# Patient Record
Sex: FEMALE | Race: WHITE | NOT HISPANIC OR LATINO
[De-identification: names, ages, dates, MRNs, and addresses within clinical notes are randomized per-mention and may not be internally consistent; named-entity substitution may affect disease eponyms.]

---
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
## 2020-01-14 ENCOUNTER — APPOINTMENT (OUTPATIENT)
Dept: DERMATOLOGY | Facility: CLINIC | Age: 20
End: 2020-01-14
Payer: COMMERCIAL

## 2020-01-14 VITALS — WEIGHT: 110 LBS | HEIGHT: 60 IN | BODY MASS INDEX: 21.6 KG/M2

## 2020-01-14 DIAGNOSIS — D48.9 NEOPLASM OF UNCERTAIN BEHAVIOR, UNSPECIFIED: ICD-10-CM

## 2020-01-14 DIAGNOSIS — D22.5 MELANOCYTIC NEVI OF TRUNK: ICD-10-CM

## 2020-01-14 PROCEDURE — 99213 OFFICE O/P EST LOW 20 MIN: CPT | Mod: 25

## 2020-01-14 PROCEDURE — 11102 TANGNTL BX SKIN SINGLE LES: CPT

## 2020-01-14 RX ORDER — ERYTHROMYCIN 20 MG/ML
2 SOLUTION TOPICAL TWICE DAILY
Qty: 1 | Refills: 1 | Status: DISCONTINUED | COMMUNITY
Start: 2019-10-11 | End: 2020-01-14

## 2020-01-14 RX ORDER — MEDROXYPROGESTERONE ACETATE 150 MG/ML
150 INJECTION, SUSPENSION INTRAMUSCULAR
Qty: 1 | Refills: 0 | Status: DISCONTINUED | COMMUNITY
Start: 2019-08-21 | End: 2020-01-14

## 2020-01-14 RX ORDER — CEPHALEXIN 500 MG/1
500 CAPSULE ORAL 3 TIMES DAILY
Qty: 21 | Refills: 0 | Status: DISCONTINUED | COMMUNITY
Start: 2019-10-11 | End: 2020-01-14

## 2020-01-14 RX ORDER — LEVONORGESTREL / ETHINYL ESTRADIOL 0.15-0.03
0.15-0.03 KIT ORAL DAILY
Qty: 90 | Refills: 0 | Status: DISCONTINUED | COMMUNITY
Start: 2018-01-08 | End: 2020-01-14

## 2020-01-14 NOTE — ASSESSMENT
[FreeTextEntry1] : The patient was instructed to check portal and/or call the office in one week for biopsy results.

## 2020-01-21 ENCOUNTER — APPOINTMENT (OUTPATIENT)
Dept: DERMATOLOGY | Facility: CLINIC | Age: 20
End: 2020-01-21

## 2020-01-22 LAB — CORE LAB BIOPSY: NORMAL

## 2020-08-03 ENCOUNTER — EMERGENCY (EMERGENCY)
Facility: HOSPITAL | Age: 20
LOS: 0 days | Discharge: HOME | End: 2020-08-03
Payer: COMMERCIAL

## 2020-08-03 VITALS
DIASTOLIC BLOOD PRESSURE: 65 MMHG | TEMPERATURE: 98 F | HEART RATE: 68 BPM | OXYGEN SATURATION: 100 % | RESPIRATION RATE: 15 BRPM | SYSTOLIC BLOOD PRESSURE: 105 MMHG

## 2020-08-03 DIAGNOSIS — Z20.828 CONTACT WITH AND (SUSPECTED) EXPOSURE TO OTHER VIRAL COMMUNICABLE DISEASES: ICD-10-CM

## 2020-08-03 DIAGNOSIS — J02.9 ACUTE PHARYNGITIS, UNSPECIFIED: ICD-10-CM

## 2020-08-03 PROCEDURE — U0003: CPT

## 2020-08-03 PROCEDURE — 99283 EMERGENCY DEPT VISIT LOW MDM: CPT

## 2020-08-03 NOTE — ED STATDOCS - OBJECTIVE STATEMENT
21 yo female presents with sore throat s?p exposed to someone who was exposed to someone tested + for covid.

## 2020-08-03 NOTE — ED STATDOCS - PATIENT PORTAL LINK FT
You can access the FollowMyHealth Patient Portal offered by [facility] by registering at the following website: [email or web address]. By joining [facility]’s FollowMyHealth portal, you will also be able to view your health information using other applications (apps) compatible with our system.

## 2020-08-03 NOTE — ED ADULT TRIAGE NOTE - CHIEF COMPLAINT QUOTE
Pt presents to ED for covid testing, pt denies symptoms, pt seen and evaluated by PA. pt gave verbal consent for resutls to be recevied via text.

## 2020-08-04 LAB — SARS-COV-2 RNA SPEC QL NAA+PROBE: SIGNIFICANT CHANGE UP

## 2020-08-11 PROBLEM — Z78.9 OTHER SPECIFIED HEALTH STATUS: Chronic | Status: ACTIVE | Noted: 2020-08-03

## 2020-08-25 ENCOUNTER — APPOINTMENT (OUTPATIENT)
Dept: OBGYN | Facility: CLINIC | Age: 20
End: 2020-08-25

## 2020-09-10 ENCOUNTER — APPOINTMENT (OUTPATIENT)
Dept: OBGYN | Facility: CLINIC | Age: 20
End: 2020-09-10
Payer: COMMERCIAL

## 2020-09-10 VITALS
WEIGHT: 112 LBS | DIASTOLIC BLOOD PRESSURE: 62 MMHG | RESPIRATION RATE: 14 BRPM | HEART RATE: 68 BPM | BODY MASS INDEX: 21.99 KG/M2 | SYSTOLIC BLOOD PRESSURE: 100 MMHG | TEMPERATURE: 98 F | HEIGHT: 60 IN

## 2020-09-10 DIAGNOSIS — Z30.019 ENCOUNTER FOR INITIAL PRESCRIPTION OF CONTRACEPTIVES, UNSPECIFIED: ICD-10-CM

## 2020-09-10 LAB
HCG UR QL: NEGATIVE
QUALITY CONTROL: YES

## 2020-09-10 PROCEDURE — 81025 URINE PREGNANCY TEST: CPT

## 2020-09-10 PROCEDURE — 99395 PREV VISIT EST AGE 18-39: CPT

## 2020-09-24 ENCOUNTER — EMERGENCY (EMERGENCY)
Facility: HOSPITAL | Age: 20
LOS: 0 days | Discharge: HOME | End: 2020-09-24
Payer: COMMERCIAL

## 2020-09-24 VITALS
SYSTOLIC BLOOD PRESSURE: 106 MMHG | DIASTOLIC BLOOD PRESSURE: 75 MMHG | OXYGEN SATURATION: 100 % | RESPIRATION RATE: 16 BRPM | HEART RATE: 50 BPM | TEMPERATURE: 98 F

## 2020-09-24 DIAGNOSIS — R50.9 FEVER, UNSPECIFIED: ICD-10-CM

## 2020-09-24 DIAGNOSIS — R09.89 OTHER SPECIFIED SYMPTOMS AND SIGNS INVOLVING THE CIRCULATORY AND RESPIRATORY SYSTEMS: ICD-10-CM

## 2020-09-24 DIAGNOSIS — J06.9 ACUTE UPPER RESPIRATORY INFECTION, UNSPECIFIED: ICD-10-CM

## 2020-09-24 DIAGNOSIS — R05 COUGH: ICD-10-CM

## 2020-09-24 LAB — SARS-COV-2 RNA SPEC QL NAA+PROBE: SIGNIFICANT CHANGE UP

## 2020-09-24 PROCEDURE — 99283 EMERGENCY DEPT VISIT LOW MDM: CPT

## 2020-09-24 PROCEDURE — U0003: CPT

## 2020-09-24 NOTE — ED STATDOCS - CLINICAL SUMMARY MEDICAL DECISION MAKING FREE TEXT BOX
Patient presents with URI symptoms, fever and concern for COVID exposure.  As patient is nontoxic appearing will test for COVID and d/c.  Quarantine reviewed and return precautions reviewed. ~Lui Berrios PA-C

## 2020-09-24 NOTE — ED STATDOCS - PHYSICAL EXAMINATION
PA note: Constitutional: NAD AAOx3  Eyes: EOMI, pupils equal  Head: Normocephalic, atraumatic  ENT: Throat is clear without erythema. No exudates. Airway is patent.  Mouth: no airway obstruction.   Cardiac: S1 S2. regular rate   Resp: Non-labored breathing, no tachypnea. Lungs CTA b/l. No w/r/r. Equal chest expansion and rise. O2sat 100% RA  GI: Abd soft. NT/ND.   Neuro: CN2-12 intact. No focal deficits.   Skin: No rashes  ~Lui Berrios PA-C

## 2020-09-24 NOTE — ED STATDOCS - PROGRESS NOTE DETAILS
How to get your Coronavirus (COVID-19) Testing Results:   Please be advised that you were tested for the coronavirus (COVID-19) in the Emergency Department at [facility].  You are to maintain self-quarantine procedures for 14 days until instructed otherwise by one of our healthcare agents. Please note that the test may take up to 2-4 days to result.  If you do not hear from us within 72 hours and you'd like to check on your results, you can call on of our coronavirus specialists at [address] [place] [address] (available 24/7).  Please DO NOT call the site where you received the test to obtain your results. ~Lui Berrios PA-C

## 2020-09-24 NOTE — ED STATDOCS - NS ED ROS FT
ROS: Constitutional- +Subjective fever, +chills.  Respiratory- +cough, no SOB  Cardiac- no chest pain, no palpitations, ENT- +rhinorrhea, +sore throat, +congestion. No loss of sense of smell or taste. Abdomen- No nausea, no vomiting, no diarrhea.  Urinary- no dysuria, no urgency, no frequency.  Skin- No rashes ~ Lui Berrios PA-C

## 2020-09-24 NOTE — ED STATDOCS - OBJECTIVE STATEMENT
Patient presents to [facility] with c/o subjective fever, cough, runny nose, body aches, sore throat x 3 days. Patient was recently exposed to COVID-19. Patient is here for COVID test. ~Lui Berrios PA-C

## 2020-09-24 NOTE — ED STATDOCS - PATIENT PORTAL LINK FT
You can access the FollowMyHealth Patient Portal offered by [facility] by registering at the following website: [email or web address]. By joining [facility]’s FollowMyHealth portal, you will also be able to view your health information using other applications (apps) compatible with our system.

## 2020-12-16 PROBLEM — J06.9 UPPER RESPIRATORY INFECTION, ACUTE: Status: RESOLVED | Noted: 2018-05-14 | Resolved: 2020-12-16

## 2021-04-01 ENCOUNTER — APPOINTMENT (OUTPATIENT)
Dept: OBGYN | Facility: CLINIC | Age: 21
End: 2021-04-01
Payer: COMMERCIAL

## 2021-04-01 ENCOUNTER — NON-APPOINTMENT (OUTPATIENT)
Age: 21
End: 2021-04-01

## 2021-04-01 VITALS
DIASTOLIC BLOOD PRESSURE: 68 MMHG | SYSTOLIC BLOOD PRESSURE: 98 MMHG | BODY MASS INDEX: 21.4 KG/M2 | HEIGHT: 60 IN | WEIGHT: 109 LBS

## 2021-04-01 PROCEDURE — 99213 OFFICE O/P EST LOW 20 MIN: CPT

## 2021-04-01 PROCEDURE — 99072 ADDL SUPL MATRL&STAF TM PHE: CPT

## 2021-04-01 NOTE — PHYSICAL EXAM
[Acute Distress] : no acute distress [LAD] : no lymphadenopathy [Thyroid Nodule] : no thyroid nodule [Goiter] : no goiter [Mass] : no breast mass [Nipple Discharge] : no nipple discharge [Axillary LAD] : no axillary lymphadenopathy [Tender] : non tender [Distended] : not distended [H/Smegaly] : no hepatosplenomegaly [Depressed Mood] : not depressed [Flat Affect] : affect not flat [Appropriately responsive] : appropriately responsive [Alert] : alert [No Acute Distress] : no acute distress [Oriented x3] : oriented x3

## 2021-04-01 NOTE — PHYSICAL EXAM
[Awake] : awake [Soft] : soft [No Bleeding] : there was no active vaginal bleeding [Normal] : uterus [Uterine Adnexae] : were not tender and not enlarged [Appropriately responsive] : appropriately responsive [Alert] : alert [No Acute Distress] : no acute distress [Oriented x3] : oriented x3

## 2021-04-02 NOTE — HISTORY OF PRESENT ILLNESS
[FreeTextEntry1] : 21 yo G0 with LMP  2/27/21 for birth control follow up.\par \par Steady with BF. Regular menses. Desires to continue same.

## 2021-04-02 NOTE — DISCUSSION/SUMMARY
[FreeTextEntry1] : Risks, benefits and alternatives to OCP discussed with patient including breast cancer, venous thromboembolism and benefits of reduced ovarian cancer. Patient will continue at this time.\par Renew Alesse\par \par STD:\par screen Gc/chl from urine\par \par RTO 6 months for annual

## 2021-08-03 ENCOUNTER — APPOINTMENT (OUTPATIENT)
Dept: DERMATOLOGY | Facility: CLINIC | Age: 21
End: 2021-08-03

## 2021-09-16 ENCOUNTER — APPOINTMENT (OUTPATIENT)
Dept: OBGYN | Facility: CLINIC | Age: 21
End: 2021-09-16

## 2022-06-27 ENCOUNTER — APPOINTMENT (OUTPATIENT)
Dept: OBGYN | Facility: CLINIC | Age: 22
End: 2022-06-27
Payer: COMMERCIAL

## 2022-06-27 ENCOUNTER — TRANSCRIPTION ENCOUNTER (OUTPATIENT)
Age: 22
End: 2022-06-27

## 2022-06-27 VITALS
WEIGHT: 106 LBS | SYSTOLIC BLOOD PRESSURE: 98 MMHG | HEIGHT: 60 IN | DIASTOLIC BLOOD PRESSURE: 60 MMHG | BODY MASS INDEX: 20.81 KG/M2

## 2022-06-27 DIAGNOSIS — Z30.09 ENCOUNTER FOR OTHER GENERAL COUNSELING AND ADVICE ON CONTRACEPTION: ICD-10-CM

## 2022-06-27 DIAGNOSIS — Z30.41 ENCOUNTER FOR SURVEILLANCE OF CONTRACEPTIVE PILLS: ICD-10-CM

## 2022-06-27 DIAGNOSIS — Z11.3 ENCOUNTER FOR SCREENING FOR INFECTIONS WITH A PREDOMINANTLY SEXUAL MODE OF TRANSMISSION: ICD-10-CM

## 2022-06-27 DIAGNOSIS — Z32.02 ENCOUNTER FOR PREGNANCY TEST, RESULT NEGATIVE: ICD-10-CM

## 2022-06-27 LAB
HCG UR QL: NEGATIVE
QUALITY CONTROL: YES

## 2022-06-27 PROCEDURE — 81025 URINE PREGNANCY TEST: CPT

## 2022-06-27 PROCEDURE — 99395 PREV VISIT EST AGE 18-39: CPT

## 2022-06-27 NOTE — HISTORY OF PRESENT ILLNESS
[FreeTextEntry8] : PEYMAN states he has been having a runny nose with clean mucus, coughing up clear phlegm, pain in temples, has had trouble sleeping and pain in eyes since 1/9/2020\par Vijay Tapia
[FreeTextEntry1] : PT PRESENTS FOR ANNUAL EXAM

## 2022-06-27 NOTE — DISCUSSION/SUMMARY
[FreeTextEntry1] : 23 YO PT PRESENTS FOR [employer]. NO COMPLAINTS OR CONCERNS AT THIS TIME. INTERESTED IN POTENTIALLY CHANGING HER BIRTH CONTROL \par UCG- NEGATIVE \par \par CBE WNL, SBE TEACHING CONDUCTED WITH RETURN DEMONSTRATION\par \par PAP SMEAR COLLECTED WITHOUT COMPLICATION- STD TESTING DESIRED \par BIMANUAL EXAM COMPLETED, WNL \par \par CONTRACEPTION AND ACHES DISCUSSED IN GREAT DETAIL\par PATIENT DECIDES ON OCP AT THIS TIME \par RX SENT TO PHARMACY WITH INSTRUCTIONS ON USE \par PT EDUCATED ON IUD PROCEDURE R/B/A D/W PT IN GREAT DETAIL \par ALL QUESTIONS ANSWERED TO PATIENT SATISFACTION \par \par RTO IN 6 MOS FOR OCP REFILL OR PRN \par \par

## 2022-06-27 NOTE — COUNSELING
[Nutrition/ Exercise/ Weight Management] : nutrition, exercise, weight management [Body Image] : body image [Vitamins/Supplements] : vitamins/supplements [Breast Self Exam] : breast self exam [Contraception/ Emergency Contraception/ Safe Sexual Practices] : contraception, emergency contraception, safe sexual practices [STD (testing, results, tx)] : STD (testing, results, tx) [Medication Management] : medication management

## 2022-06-29 LAB — CYTOLOGY CVX/VAG DOC THIN PREP: NORMAL

## 2023-07-14 ENCOUNTER — OFFICE (OUTPATIENT)
Dept: URBAN - METROPOLITAN AREA CLINIC 12 | Facility: CLINIC | Age: 23
Setting detail: OPHTHALMOLOGY
End: 2023-07-14

## 2023-07-14 DIAGNOSIS — H52.13: ICD-10-CM

## 2023-07-14 PROCEDURE — SCREF LASIK EVAL: Performed by: OPHTHALMOLOGY

## 2023-07-14 ASSESSMENT — KERATOMETRY
OS_K1POWER_DIOPTERS: 43.50
OD_K2POWER_DIOPTERS: 44.25
OD_K1POWER_DIOPTERS: 44.25
OS_K2POWER_DIOPTERS: 44.00
OD_AXISANGLE_DEGREES: 090
OS_AXISANGLE_DEGREES: 083

## 2023-07-14 ASSESSMENT — VISUAL ACUITY
OS_BCVA: 20/30-2
OD_BCVA: 20/30

## 2023-07-14 ASSESSMENT — AXIALLENGTH_DERIVED
OS_AL: 25.4924
OS_AL: 25.4924

## 2023-07-14 ASSESSMENT — CONFRONTATIONAL VISUAL FIELD TEST (CVF)
OD_FINDINGS: FULL
OS_FINDINGS: FULL

## 2023-07-14 ASSESSMENT — REFRACTION_AUTOREFRACTION
OD_SPHERE: -6.00
OD_CYLINDER: SPH
OS_CYLINDER: -0.50
OD_AXIS: 0
OS_AXIS: 171
OS_SPHERE: -4.50

## 2023-07-14 ASSESSMENT — REFRACTION_MANIFEST
OS_CYLINDER: -0.50
OD_AXIS: 0
OS_AXIS: 171
OD_VA1: 20/20
OD_CYLINDER: SPH
OS_SPHERE: -4.50
OS_VA1: 20/20
OD_SPHERE: -6.00

## 2023-07-14 ASSESSMENT — REFRACTION_CURRENTRX
OS_VPRISM_DIRECTION: SV
OS_OVR_VA: 20/
OD_VPRISM_DIRECTION: SV
OD_OVR_VA: 20/

## 2023-07-14 ASSESSMENT — SPHEQUIV_DERIVED
OS_SPHEQUIV: -4.75
OS_SPHEQUIV: -4.75

## 2023-07-14 ASSESSMENT — TONOMETRY
OD_IOP_MMHG: 17
OS_IOP_MMHG: 17

## 2023-08-24 ENCOUNTER — OFFICE (OUTPATIENT)
Dept: URBAN - METROPOLITAN AREA CLINIC 12 | Facility: CLINIC | Age: 23
Setting detail: OPHTHALMOLOGY
End: 2023-08-24

## 2023-08-24 DIAGNOSIS — H52.13: ICD-10-CM

## 2023-08-24 PROCEDURE — SCREF LASIK EVAL: Performed by: OPHTHALMOLOGY

## 2023-08-24 ASSESSMENT — CONFRONTATIONAL VISUAL FIELD TEST (CVF)
OD_FINDINGS: FULL
OS_FINDINGS: FULL

## 2023-08-25 ENCOUNTER — RX ONLY (RX ONLY)
Age: 23
End: 2023-08-25

## 2023-08-25 ENCOUNTER — OTHER LOCATION (OUTPATIENT)
Dept: URBAN - METROPOLITAN AREA LASIK CENTER 6 | Facility: LASIK CENTER | Age: 23
Setting detail: OPHTHALMOLOGY
End: 2023-08-25

## 2023-08-25 DIAGNOSIS — H52.13: ICD-10-CM

## 2023-08-25 PROCEDURE — 65760 KERATOMILEUSIS: CPT | Performed by: OPHTHALMOLOGY

## 2023-08-25 ASSESSMENT — REFRACTION_CURRENTRX
OD_CYLINDER: -0.25
OS_CYLINDER: -0.50
OS_SPHERE: -5.00
OS_OVR_VA: 20/
OS_VPRISM_DIRECTION: SV
OD_VPRISM_DIRECTION: SV
OS_OVR_VA: 20/
OD_VPRISM_DIRECTION: SV
OS_AXIS: 171
OD_AXIS: 034
OD_OVR_VA: 20/
OD_CYLINDER: -0.25
OD_SPHERE: -6.25
OD_OVR_VA: 20/
OS_SPHERE: -5.00
OS_CYLINDER: -0.50
OS_AXIS: 171
OD_AXIS: 034
OD_SPHERE: -6.25
OS_VPRISM_DIRECTION: SV

## 2023-08-25 ASSESSMENT — REFRACTION_MANIFEST
OS_VA1: 20/20-2
OD_CYLINDER: -0.25
OS_AXIS: 175
OD_AXIS: 15
OS_AXIS: 175
OD_CYLINDER: -0.25
OD_VA1: 20/20-2
OS_VA1: 20/20-2
OS_SPHERE: -4.05
OS_SPHERE: -4.05
OD_VA1: 20/20-2
OD_SPHERE: -5.87
OS_CYLINDER: -0.75
OD_AXIS: 15
OD_SPHERE: -5.87
OS_CYLINDER: -0.75

## 2023-08-25 ASSESSMENT — AXIALLENGTH_DERIVED
OD_AL: 25.7966
OD_AL: 25.7966
OS_AL: 25.1874
OS_AL: 25.1874
OD_AL: 25.79
OS_AL: 25.3888
OS_AL: 25.3888
OD_AL: 25.79

## 2023-08-25 ASSESSMENT — SPHEQUIV_DERIVED
OD_SPHEQUIV: -5.995
OS_SPHEQUIV: -4.875
OD_SPHEQUIV: -6
OS_SPHEQUIV: -4.42
OD_SPHEQUIV: -5.995
OS_SPHEQUIV: -4.875
OD_SPHEQUIV: -6
OS_SPHEQUIV: -4.42

## 2023-08-25 ASSESSMENT — KERATOMETRY
OD_K1POWER_DIOPTERS: 44.25
OD_K2POWER_DIOPTERS: 44.50
OS_AXISANGLE_DEGREES: 086
OD_AXISANGLE_DEGREES: 092
OD_AXISANGLE_DEGREES: 092
OS_K1POWER_DIOPTERS: 43.75
OS_AXISANGLE_DEGREES: 086
OD_K1POWER_DIOPTERS: 44.25
OD_K2POWER_DIOPTERS: 44.50
OS_K2POWER_DIOPTERS: 44.50
OS_K2POWER_DIOPTERS: 44.50
OS_K1POWER_DIOPTERS: 43.75

## 2023-08-25 ASSESSMENT — REFRACTION_AUTOREFRACTION
OD_SPHERE: -5.75
OD_CYLINDER: -0.50
OS_AXIS: 176
OS_SPHERE: -4.50
OS_AXIS: 176
OS_CYLINDER: -0.75
OD_AXIS: 005
OS_SPHERE: -4.50
OD_CYLINDER: -0.50
OS_CYLINDER: -0.75
OD_SPHERE: -5.75
OD_AXIS: 005

## 2023-08-25 ASSESSMENT — VISUAL ACUITY
OS_BCVA: 20/20
OD_BCVA: 20/20
OD_BCVA: 20/20
OS_BCVA: 20/20

## 2023-08-26 ENCOUNTER — OFFICE (OUTPATIENT)
Dept: URBAN - METROPOLITAN AREA CLINIC 12 | Facility: CLINIC | Age: 23
Setting detail: OPHTHALMOLOGY
End: 2023-08-26

## 2023-08-26 DIAGNOSIS — H52.13: ICD-10-CM

## 2023-08-26 PROCEDURE — 99024 POSTOP FOLLOW-UP VISIT: CPT | Performed by: OPHTHALMOLOGY

## 2023-08-26 ASSESSMENT — CONFRONTATIONAL VISUAL FIELD TEST (CVF)
OD_FINDINGS: FULL
OS_FINDINGS: FULL

## 2023-08-26 ASSESSMENT — REFRACTION_CURRENTRX
OD_VPRISM_DIRECTION: SV
OD_CYLINDER: -0.25
OS_CYLINDER: -0.50
OD_AXIS: 034
OS_SPHERE: -5.00
OS_AXIS: 171
OS_OVR_VA: 20/
OD_OVR_VA: 20/
OS_VPRISM_DIRECTION: SV
OD_SPHERE: -6.25

## 2023-08-26 ASSESSMENT — REFRACTION_AUTOREFRACTION
OS_SPHERE: +1.00
OD_SPHERE: +1.00
OD_CYLINDER: -0.25
OS_CYLINDER: SPHERE
OD_AXIS: 167

## 2023-08-26 ASSESSMENT — REFRACTION_MANIFEST
OS_AXIS: 175
OS_SPHERE: -4.05
OD_VA1: 20/20-2
OD_SPHERE: -5.87
OS_VA1: 20/20-2
OD_CYLINDER: -0.25
OS_CYLINDER: -0.75
OD_AXIS: 15

## 2023-08-26 ASSESSMENT — VISUAL ACUITY
OD_BCVA: 20/15
OS_BCVA: 20/15

## 2023-08-26 ASSESSMENT — KERATOMETRY
OD_AXISANGLE_DEGREES: 90
OD_K1POWER_DIOPTERS: 38.75
OS_K1POWER_DIOPTERS: 39.25
OS_AXISANGLE_DEGREES: 90
OS_K2POWER_DIOPTERS: 39.25
OD_K2POWER_DIOPTERS: 38.75

## 2023-08-26 ASSESSMENT — AXIALLENGTH_DERIVED
OD_AL: 28.53
OD_AL: 25.0845
OS_AL: 27.4084

## 2023-08-26 ASSESSMENT — SPHEQUIV_DERIVED
OD_SPHEQUIV: 0.875
OD_SPHEQUIV: -5.995
OS_SPHEQUIV: -4.42

## 2023-09-01 ENCOUNTER — OFFICE (OUTPATIENT)
Dept: URBAN - METROPOLITAN AREA CLINIC 12 | Facility: CLINIC | Age: 23
Setting detail: OPHTHALMOLOGY
End: 2023-09-01

## 2023-09-01 DIAGNOSIS — H52.13: ICD-10-CM

## 2023-09-01 PROBLEM — H43.393 VITREOUS FLOATERS; BOTH EYES: Status: ACTIVE | Noted: 2023-08-24

## 2023-09-01 PROCEDURE — 99024 POSTOP FOLLOW-UP VISIT: CPT | Performed by: OPTOMETRIST

## 2023-09-01 ASSESSMENT — REFRACTION_CURRENTRX
OS_SPHERE: -5.00
OD_OVR_VA: 20/
OD_SPHERE: -6.25
OD_AXIS: 034
OS_OVR_VA: 20/
OD_VPRISM_DIRECTION: SV
OS_CYLINDER: -0.50
OS_VPRISM_DIRECTION: SV
OD_CYLINDER: -0.25
OS_AXIS: 171

## 2023-09-01 ASSESSMENT — KERATOMETRY
OD_K1POWER_DIOPTERS: 38.50
OD_AXISANGLE_DEGREES: 090
OS_K2POWER_DIOPTERS: 39.50
OD_K2POWER_DIOPTERS: 38.50
OS_K1POWER_DIOPTERS: 39.00
OS_AXISANGLE_DEGREES: 123
METHOD_AUTO_MANUAL: AUTO

## 2023-09-01 ASSESSMENT — VISUAL ACUITY
OS_BCVA: 20/15
OD_BCVA: 20/15-1

## 2023-09-01 ASSESSMENT — REFRACTION_AUTOREFRACTION
OD_CYLINDER: -0.25
OD_AXIS: 007
OS_CYLINDER: -0.25
OS_AXIS: 042
OD_SPHERE: +1.25
OS_SPHERE: +0.75

## 2023-09-01 ASSESSMENT — REFRACTION_MANIFEST
OS_VA1: 20/20-2
OD_SPHERE: -5.87
OD_AXIS: 15
OS_CYLINDER: -0.75
OD_CYLINDER: -0.25
OD_VA1: 20/20-2
OS_SPHERE: -4.05
OS_AXIS: 175

## 2023-09-01 ASSESSMENT — AXIALLENGTH_DERIVED
OD_AL: 25.0782
OD_AL: 28.66
OS_AL: 24.9874
OS_AL: 27.4084

## 2023-09-01 ASSESSMENT — SPHEQUIV_DERIVED
OD_SPHEQUIV: -5.995
OS_SPHEQUIV: 0.625
OD_SPHEQUIV: 1.125
OS_SPHEQUIV: -4.42

## 2023-09-01 ASSESSMENT — TONOMETRY
OS_IOP_MMHG: 12
OD_IOP_MMHG: 13

## 2023-09-01 ASSESSMENT — CONFRONTATIONAL VISUAL FIELD TEST (CVF)
OS_FINDINGS: FULL
OD_FINDINGS: FULL

## 2023-09-26 ENCOUNTER — OFFICE (OUTPATIENT)
Dept: URBAN - METROPOLITAN AREA CLINIC 12 | Facility: CLINIC | Age: 23
Setting detail: OPHTHALMOLOGY
End: 2023-09-26

## 2023-09-26 ENCOUNTER — RX ONLY (RX ONLY)
Age: 23
End: 2023-09-26

## 2023-09-26 DIAGNOSIS — H52.13: ICD-10-CM

## 2023-09-26 PROCEDURE — 99024 POSTOP FOLLOW-UP VISIT: CPT | Performed by: OPHTHALMOLOGY

## 2023-09-26 ASSESSMENT — CONFRONTATIONAL VISUAL FIELD TEST (CVF)
OD_FINDINGS: FULL
OS_FINDINGS: FULL

## 2023-09-26 ASSESSMENT — REFRACTION_CURRENTRX
OS_OVR_VA: 20/
OD_VPRISM_DIRECTION: SV
OD_CYLINDER: -0.25
OS_VPRISM_DIRECTION: SV
OD_OVR_VA: 20/
OD_SPHERE: -6.25
OD_AXIS: 034
OS_CYLINDER: -0.50
OS_AXIS: 171
OS_SPHERE: -5.00

## 2023-09-26 ASSESSMENT — REFRACTION_AUTOREFRACTION
OD_SPHERE: +0.50
OS_SPHERE: +0.25
OD_AXIS: 136
OS_AXIS: 019
OS_CYLINDER: -0.25
OD_CYLINDER: -0.25

## 2023-09-26 ASSESSMENT — AXIALLENGTH_DERIVED
OD_AL: 25.2015
OD_AL: 28.39
OS_AL: 25.0516
OS_AL: 27.22

## 2023-09-26 ASSESSMENT — SPHEQUIV_DERIVED
OD_SPHEQUIV: -5.995
OS_SPHEQUIV: 0.125
OD_SPHEQUIV: 0.375
OS_SPHEQUIV: -4.42

## 2023-09-26 ASSESSMENT — VISUAL ACUITY
OS_BCVA: 20/20
OD_BCVA: 20/20

## 2023-09-26 ASSESSMENT — REFRACTION_MANIFEST
OS_SPHERE: -4.05
OD_SPHERE: -5.87
OD_CYLINDER: -0.25
OS_VA1: 20/20-2
OS_AXIS: 175
OS_CYLINDER: -0.75
OD_AXIS: 15
OD_VA1: 20/20-2

## 2023-09-26 ASSESSMENT — KERATOMETRY
OD_K2POWER_DIOPTERS: 39.00
OD_AXISANGLE_DEGREES: 090
OS_K2POWER_DIOPTERS: 39.75
OS_K1POWER_DIOPTERS: 39.50
METHOD_AUTO_MANUAL: AUTO
OD_K1POWER_DIOPTERS: 39.00
OS_AXISANGLE_DEGREES: 116

## 2023-09-26 ASSESSMENT — TONOMETRY
OS_IOP_MMHG: 12
OD_IOP_MMHG: 11

## 2024-01-29 ENCOUNTER — APPOINTMENT (OUTPATIENT)
Dept: OBGYN | Facility: CLINIC | Age: 24
End: 2024-01-29
Payer: COMMERCIAL

## 2024-01-29 VITALS
BODY MASS INDEX: 23.16 KG/M2 | HEIGHT: 60 IN | DIASTOLIC BLOOD PRESSURE: 66 MMHG | SYSTOLIC BLOOD PRESSURE: 110 MMHG | WEIGHT: 118 LBS

## 2024-01-29 DIAGNOSIS — Z01.419 ENCOUNTER FOR GYNECOLOGICAL EXAMINATION (GENERAL) (ROUTINE) W/OUT ABNORMAL FINDINGS: ICD-10-CM

## 2024-01-29 DIAGNOSIS — Z30.09 ENCOUNTER FOR OTHER GENERAL COUNSELING AND ADVICE ON CONTRACEPTION: ICD-10-CM

## 2024-01-29 PROCEDURE — 99395 PREV VISIT EST AGE 18-39: CPT

## 2024-01-29 NOTE — DISCUSSION/SUMMARY
[FreeTextEntry1] : disc kyleena IUD RBAD  encouraged pt to exercise 2.5 hours minimally a week as per the [facility] , to monitor her cycles, to take calcium foods equalling 1000 mg daily and vitamin D 1000 IU daily and fish oil or flax seed oil for omega 3s. REturn in one yr for a visit. questions answered. Encouraged SBE

## 2024-01-29 NOTE — HISTORY OF PRESENT ILLNESS
[FreeTextEntry1] : 22 yo  has been on aviane, and the depo felt pichardo gained  weight. She is thinking of the iud.  [Currently Active] : currently active [Men] : men [Vaginal] : vaginal [No] : No [Condoms] : Condoms

## 2024-02-01 LAB — CYTOLOGY CVX/VAG DOC THIN PREP: NORMAL

## 2024-03-05 ENCOUNTER — APPOINTMENT (OUTPATIENT)
Dept: OBGYN | Facility: CLINIC | Age: 24
End: 2024-03-05
Payer: COMMERCIAL

## 2024-03-05 VITALS
WEIGHT: 116 LBS | DIASTOLIC BLOOD PRESSURE: 60 MMHG | BODY MASS INDEX: 22.78 KG/M2 | HEART RATE: 75 BPM | RESPIRATION RATE: 14 BRPM | HEIGHT: 60 IN | SYSTOLIC BLOOD PRESSURE: 110 MMHG

## 2024-03-05 PROCEDURE — 99213 OFFICE O/P EST LOW 20 MIN: CPT

## 2024-03-05 NOTE — HISTORY OF PRESENT ILLNESS
[FreeTextEntry1] : Pt presents c/o lump in vaginal wall left side x 1 week. Not painful, tried warm compresses, didnt help, no dc.

## 2024-03-11 ENCOUNTER — APPOINTMENT (OUTPATIENT)
Dept: GYNECOLOGIC ONCOLOGY | Facility: CLINIC | Age: 24
End: 2024-03-11
Payer: COMMERCIAL

## 2024-03-11 DIAGNOSIS — Z78.9 OTHER SPECIFIED HEALTH STATUS: ICD-10-CM

## 2024-03-11 DIAGNOSIS — B37.9 CANDIDIASIS, UNSPECIFIED: ICD-10-CM

## 2024-03-11 DIAGNOSIS — N89.8 OTHER SPECIFIED NONINFLAMMATORY DISORDERS OF VAGINA: ICD-10-CM

## 2024-03-11 DIAGNOSIS — B37.2 CANDIDIASIS OF SKIN AND NAIL: ICD-10-CM

## 2024-03-11 PROCEDURE — 99459 PELVIC EXAMINATION: CPT

## 2024-03-11 PROCEDURE — 99204 OFFICE O/P NEW MOD 45 MIN: CPT

## 2024-03-11 RX ORDER — LEVONORGESTREL AND ETHINYL ESTRADIOL 0.1-0.02MG
0.1-2 KIT ORAL DAILY
Qty: 84 | Refills: 1 | Status: DISCONTINUED | COMMUNITY
Start: 2020-09-10 | End: 2024-03-11

## 2024-03-11 RX ORDER — FLUCONAZOLE 150 MG/1
150 TABLET ORAL
Qty: 1 | Refills: 0 | Status: ACTIVE | COMMUNITY
Start: 2024-03-11 | End: 1900-01-01

## 2024-03-12 NOTE — HISTORY OF PRESENT ILLNESS
[FreeTextEntry1] : 22yo nulligravida LMP 2/14 presents today with referral from Dr. Cochran for vaginal vs. urethral mass. Pt. reports vaginal canal lump palpated about 2 weeks ago prompting visit to GYN.  She was noted to have a katharina-urethral mass by Dr. Cochran with recommendation for referral to Urology. She has made appointment with Dr. Lehman but could not get in until April. She reports trialing warm compresses/baths 3x a week without resolution. She admits to one week of bilateral pelvic cramping, unrelated to menses. She denies abnormal bleeding, discharge or issues with bladder habits.   LPAP- June 2024, negative LMammo- 2019, normal. Performed for palpation of mass, noted to be dense breasts per pt.

## 2024-03-12 NOTE — PLAN
[TextEntry] : VAIBHAV, I&D of skenes gland cyst, cystoscopy Consent signed in office PST with at least CBC, BMP and Coags Patient to cancel above procedure if able to resolve cyst with conservative management

## 2024-03-12 NOTE — CHIEF COMPLAINT
[FreeTextEntry1] : [facility] [phone] at [address] [place] [address]

## 2024-03-12 NOTE — ASSESSMENT
[FreeTextEntry1] : 24yo with vaginal wall cyst c/w a skenes gland cyst on PE today.   I discussed with patient it would be unusual to have a urethral mass and therefore I favor skenes gland cyst vs, diverticula. I favor benign process due to her age, the risk of a cancer is extremely low. Following PE I believe patient has a cyst of the skenes gland. Her urethra is not prolapsed or inverted and does not have any leakage of urine on palpation which I would expect if urethra was involved. I discussed in great detail with the patient and her significant other how these develop. She understands that these glands provide lubrication and therefore can get clogged. I believe this is what she is experiencing. I do not see any concern for infection at this time and therefore do not feel excision is necessary. Options for management include drainage in office with local anesthetic vs. under anesthesia. Patient understands that this is a sensitive area and therefore procedure can be painful. She opted for procedure under anesthesia. If this is to fail then would recommend excision as second line treatment. Patient can also try conservative management with epsom salt sitz baths followed by 15 minute massage of the area. If this is to resolve cyst in the interim of surgical planning can then cancel procedure. I offered patient option to pursue cystoscopy at time of I&D for further reassurance that urethra is not involved.   I discussed at length with the patient the risks, benefits, and alternatives to PEUA, I&D of skenes gland cyst and cystoscoypy. She understands the risks to include (but not be limited to): infection with need for hospitalization; bleeding with need for transfusion. The patient agrees to proceed.

## 2024-03-12 NOTE — PHYSICAL EXAM
[Chaperone Present] : A chaperone was present in the examining room during all aspects of the physical examination [Normal] : Bimanual Exam: Normal [FreeTextEntry1] : Yesenia Newby PA-C [de-identified] : no urine with palpation under urethra and therefore less likely to be a diverticula [de-identified] : mass seen just below urethral meatus, 5 oclock and likely representative of a skenes gland cyst. Mass small and smooth. Yeast noted.

## 2024-03-12 NOTE — END OF VISIT
[FreeTextEntry3] :  I, Dr. Jon Partida personally performed the evaluation and management (E/M) services for this new patient. That E/M includes conducting the initial examination, assessing all conditions, and establishing the plan of care.  Today, Salina Newby PA-C, was here to observe my evaluation and management services for this patient to be followed going forward.

## 2024-04-02 ENCOUNTER — OUTPATIENT (OUTPATIENT)
Dept: OUTPATIENT SERVICES | Facility: HOSPITAL | Age: 24
LOS: 1 days | End: 2024-04-02
Payer: COMMERCIAL

## 2024-04-02 DIAGNOSIS — Z01.818 ENCOUNTER FOR OTHER PREPROCEDURAL EXAMINATION: ICD-10-CM

## 2024-04-02 LAB
A1C WITH ESTIMATED AVERAGE GLUCOSE RESULT: 4.5 % — SIGNIFICANT CHANGE UP (ref 4–5.6)
ANION GAP SERPL CALC-SCNC: 12 MMOL/L — SIGNIFICANT CHANGE UP (ref 5–17)
BASOPHILS # BLD AUTO: 0.06 K/UL — SIGNIFICANT CHANGE UP (ref 0–0.2)
BASOPHILS NFR BLD AUTO: 1.1 % — SIGNIFICANT CHANGE UP (ref 0–2)
BUN SERPL-MCNC: 17.7 MG/DL — SIGNIFICANT CHANGE UP (ref 8–20)
CALCIUM SERPL-MCNC: 9.4 MG/DL — SIGNIFICANT CHANGE UP (ref 8.4–10.5)
CHLORIDE SERPL-SCNC: 104 MMOL/L — SIGNIFICANT CHANGE UP (ref 96–108)
CO2 SERPL-SCNC: 24 MMOL/L — SIGNIFICANT CHANGE UP (ref 22–29)
CREAT SERPL-MCNC: 0.65 MG/DL — SIGNIFICANT CHANGE UP (ref 0.5–1.3)
EGFR: 127 ML/MIN/1.73M2 — SIGNIFICANT CHANGE UP
EOSINOPHIL # BLD AUTO: 0.13 K/UL — SIGNIFICANT CHANGE UP (ref 0–0.5)
EOSINOPHIL NFR BLD AUTO: 2.5 % — SIGNIFICANT CHANGE UP (ref 0–6)
ESTIMATED AVERAGE GLUCOSE: 82 MG/DL — SIGNIFICANT CHANGE UP (ref 68–114)
GLUCOSE SERPL-MCNC: 93 MG/DL — SIGNIFICANT CHANGE UP (ref 70–99)
HCG SERPL-ACNC: <4 MIU/ML — SIGNIFICANT CHANGE UP
HCT VFR BLD CALC: 38.5 % — SIGNIFICANT CHANGE UP (ref 34.5–45)
HGB BLD-MCNC: 13.2 G/DL — SIGNIFICANT CHANGE UP (ref 11.5–15.5)
IMM GRANULOCYTES NFR BLD AUTO: 0.2 % — SIGNIFICANT CHANGE UP (ref 0–0.9)
LYMPHOCYTES # BLD AUTO: 2.25 K/UL — SIGNIFICANT CHANGE UP (ref 1–3.3)
LYMPHOCYTES # BLD AUTO: 42.9 % — SIGNIFICANT CHANGE UP (ref 13–44)
MCHC RBC-ENTMCNC: 31.5 PG — SIGNIFICANT CHANGE UP (ref 27–34)
MCHC RBC-ENTMCNC: 34.3 GM/DL — SIGNIFICANT CHANGE UP (ref 32–36)
MCV RBC AUTO: 91.9 FL — SIGNIFICANT CHANGE UP (ref 80–100)
MONOCYTES # BLD AUTO: 0.42 K/UL — SIGNIFICANT CHANGE UP (ref 0–0.9)
MONOCYTES NFR BLD AUTO: 8 % — SIGNIFICANT CHANGE UP (ref 2–14)
NEUTROPHILS # BLD AUTO: 2.38 K/UL — SIGNIFICANT CHANGE UP (ref 1.8–7.4)
NEUTROPHILS NFR BLD AUTO: 45.3 % — SIGNIFICANT CHANGE UP (ref 43–77)
PLATELET # BLD AUTO: 216 K/UL — SIGNIFICANT CHANGE UP (ref 150–400)
POTASSIUM SERPL-MCNC: 4 MMOL/L — SIGNIFICANT CHANGE UP (ref 3.5–5.3)
POTASSIUM SERPL-SCNC: 4 MMOL/L — SIGNIFICANT CHANGE UP (ref 3.5–5.3)
RBC # BLD: 4.19 M/UL — SIGNIFICANT CHANGE UP (ref 3.8–5.2)
RBC # FLD: 11.5 % — SIGNIFICANT CHANGE UP (ref 10.3–14.5)
SODIUM SERPL-SCNC: 140 MMOL/L — SIGNIFICANT CHANGE UP (ref 135–145)
WBC # BLD: 5.25 K/UL — SIGNIFICANT CHANGE UP (ref 3.8–10.5)
WBC # FLD AUTO: 5.25 K/UL — SIGNIFICANT CHANGE UP (ref 3.8–10.5)

## 2024-04-02 PROCEDURE — 36415 COLL VENOUS BLD VENIPUNCTURE: CPT

## 2024-04-02 PROCEDURE — 85025 COMPLETE CBC W/AUTO DIFF WBC: CPT

## 2024-04-02 PROCEDURE — 83036 HEMOGLOBIN GLYCOSYLATED A1C: CPT

## 2024-04-02 PROCEDURE — 80048 BASIC METABOLIC PNL TOTAL CA: CPT

## 2024-04-02 PROCEDURE — 84702 CHORIONIC GONADOTROPIN TEST: CPT

## 2024-04-02 PROCEDURE — G0463: CPT

## 2024-04-17 ENCOUNTER — APPOINTMENT (OUTPATIENT)
Dept: UROLOGY | Facility: CLINIC | Age: 24
End: 2024-04-17

## 2024-04-24 ENCOUNTER — APPOINTMENT (OUTPATIENT)
Dept: GYNECOLOGIC ONCOLOGY | Facility: CLINIC | Age: 24
End: 2024-04-24
Payer: COMMERCIAL

## 2024-04-24 DIAGNOSIS — N36.8 OTHER SPECIFIED DISORDERS OF URETHRA: ICD-10-CM

## 2024-04-24 PROCEDURE — 99212 OFFICE O/P EST SF 10 MIN: CPT

## 2024-04-24 NOTE — ASSESSMENT
[FreeTextEntry1] : 24 yo s/p left skene's gland marsupialiation for a cyst, uncomplicated recovery. Recovered well.

## 2024-04-24 NOTE — REASON FOR VISIT
[Post Op] : post op visit [de-identified] : 4/8/24 [de-identified] : EUA Marsupalization of left skenes gland, cystoscopy  [de-identified] : Patient has recovered well from her procedure. She reports having no pain and no issues post-procedure. She was very happy with how things went.

## 2024-04-24 NOTE — DISCUSSION/SUMMARY
[Doing Well] : is doing well [Excellent Pain Control] : has excellent pain control [No Sign of Infection] : is showing no signs of infection [FreeTextEntry1] : Pertinent findings revealed 1 cm left skenes gland without evidence of cellulitis. Normal labia minora and majora, clitoral morrissey. Normal urethra. Cystoscopy with normal bilateral ureteral jets, no visible diverticula in the urethra or bladder. Smooth inside of the skenes gland consistent with skenes gland cyst without evidence of cellulitis or abscess formation.

## 2024-04-24 NOTE — END OF VISIT
[FreeTextEntry3] : Discharge to routine gynecologic care [FreeTextEntry2] : Written by Tawanna Martinez, acting as a scribe for Dr. Jon Partida This note accurately reflects the work and decisions made by me.

## 2025-03-06 ENCOUNTER — APPOINTMENT (OUTPATIENT)
Dept: OBGYN | Facility: CLINIC | Age: 25
End: 2025-03-06
Payer: COMMERCIAL

## 2025-03-06 ENCOUNTER — NON-APPOINTMENT (OUTPATIENT)
Age: 25
End: 2025-03-06

## 2025-03-06 VITALS
WEIGHT: 110 LBS | HEIGHT: 60 IN | SYSTOLIC BLOOD PRESSURE: 114 MMHG | RESPIRATION RATE: 18 BRPM | HEART RATE: 78 BPM | BODY MASS INDEX: 21.6 KG/M2 | DIASTOLIC BLOOD PRESSURE: 60 MMHG

## 2025-03-06 DIAGNOSIS — Z01.419 ENCOUNTER FOR GYNECOLOGICAL EXAMINATION (GENERAL) (ROUTINE) W/OUT ABNORMAL FINDINGS: ICD-10-CM

## 2025-03-06 PROCEDURE — 99395 PREV VISIT EST AGE 18-39: CPT

## 2025-03-10 LAB
BV BACTERIA RRNA VAG QL NAA+PROBE: NOT DETECTED
C GLABRATA RNA VAG QL NAA+PROBE: NOT DETECTED
C TRACH RRNA SPEC QL NAA+PROBE: NOT DETECTED
CANDIDA RRNA VAG QL PROBE: NOT DETECTED
N GONORRHOEA RRNA SPEC QL NAA+PROBE: NOT DETECTED
T VAGINALIS RRNA SPEC QL NAA+PROBE: NOT DETECTED